# Patient Record
Sex: FEMALE | Race: WHITE | NOT HISPANIC OR LATINO | URBAN - METROPOLITAN AREA
[De-identification: names, ages, dates, MRNs, and addresses within clinical notes are randomized per-mention and may not be internally consistent; named-entity substitution may affect disease eponyms.]

---

## 2021-08-19 ENCOUNTER — OFFICE VISIT (OUTPATIENT)
Dept: URBAN - METROPOLITAN AREA CLINIC 27 | Facility: CLINIC | Age: 71
End: 2021-08-19

## 2021-09-27 ENCOUNTER — OFFICE VISIT (OUTPATIENT)
Dept: URBAN - METROPOLITAN AREA SURGERY CENTER 7 | Facility: SURGERY CENTER | Age: 71
End: 2021-09-27

## 2021-09-27 PROBLEM — 40930008 HYPOTHYROIDISM: Status: ACTIVE | Noted: 2021-09-27

## 2021-09-27 PROBLEM — 429484003 HISTORY OF MALIGNANT NEOPLASM OF CERVIX: Status: ACTIVE | Noted: 2021-09-27

## 2021-09-27 PROBLEM — 276661002 AGE-RELATED OSTEOPOROSIS: Status: ACTIVE | Noted: 2021-09-27

## 2021-11-01 ENCOUNTER — LAB OUTSIDE AN ENCOUNTER (OUTPATIENT)
Dept: URBAN - METROPOLITAN AREA CLINIC 121 | Facility: CLINIC | Age: 71
End: 2021-11-01

## 2021-11-01 LAB
ZZ-GE-UNK: (no result)

## 2021-11-12 ENCOUNTER — OFFICE VISIT (OUTPATIENT)
Dept: URBAN - METROPOLITAN AREA CLINIC 27 | Facility: CLINIC | Age: 71
End: 2021-11-12

## 2022-01-04 ENCOUNTER — OFFICE VISIT (OUTPATIENT)
Dept: URBAN - METROPOLITAN AREA CLINIC 27 | Facility: CLINIC | Age: 72
End: 2022-01-04

## 2022-01-04 PROBLEM — 426867001 ANORECTAL DISORDER: Status: ACTIVE | Noted: 2022-01-04

## 2022-01-04 PROBLEM — 62315008 DIARRHEA: Status: ACTIVE | Noted: 2022-01-04

## 2022-01-04 PROBLEM — 271832001 FLATULENCE, ERUCTATION AND GAS PAIN: Status: ACTIVE | Noted: 2022-01-04

## 2022-01-04 PROBLEM — 267024001 ABNORMAL WEIGHT LOSS: Status: ACTIVE | Noted: 2022-01-04

## 2022-01-12 ENCOUNTER — OFFICE VISIT (OUTPATIENT)
Dept: URBAN - METROPOLITAN AREA MEDICAL CENTER 8 | Facility: MEDICAL CENTER | Age: 72
End: 2022-01-12

## 2022-04-30 ENCOUNTER — TELEPHONE ENCOUNTER (OUTPATIENT)
Dept: URBAN - METROPOLITAN AREA CLINIC 121 | Facility: CLINIC | Age: 72
End: 2022-04-30

## 2022-04-30 RX ORDER — PRAMOXINE HYDROCHLORIDE 150 MG/15G
APPLY A SMALL AMOUNT RECTALLY EVERY NIGHT AEROSOL, FOAM RECTAL
OUTPATIENT
Start: 2021-12-03 | End: 2022-04-08

## 2022-04-30 RX ORDER — HYDROCORTISONE ACETATE, PRAMOXINE HCL 2.5; 1 G/100G; G/100G
APPLY A SMALL AMOUNT INTO RECTUM AT BEDTIME VIA APPLICATOR CREAM TOPICAL
OUTPATIENT
Start: 2021-12-17 | End: 2022-02-15

## 2022-04-30 RX ORDER — PRAMOXINE HYDROCHLORIDE 150 MG/15G
APPLY A SMALL AMOUNT RECTALLY EVERY NIGHT AEROSOL, FOAM RECTAL
OUTPATIENT
Start: 2021-12-03 | End: 2022-03-03

## 2022-05-01 ENCOUNTER — TELEPHONE ENCOUNTER (OUTPATIENT)
Dept: URBAN - METROPOLITAN AREA CLINIC 121 | Facility: CLINIC | Age: 72
End: 2022-05-01

## 2022-05-01 RX ORDER — ESTRADIOL 10 UG/1
TABLET, FILM COATED VAGINAL
Status: ACTIVE | COMMUNITY
Start: 2021-08-20

## 2022-05-01 RX ORDER — CHONDROIT/COLLAGEN/HYALURON/AA 80-400-40
CAPSULE ORAL
Status: ACTIVE | COMMUNITY
Start: 2021-08-20

## 2022-05-01 RX ORDER — VITAMIN B COMPLEX
TABLET ORAL
Status: ACTIVE | COMMUNITY
Start: 2021-08-20

## 2022-05-01 RX ORDER — LEVOTHYROXINE SODIUM 137 UG/1
TABLET ORAL
Status: ACTIVE | COMMUNITY
Start: 2021-08-20

## 2022-05-01 RX ORDER — VITAMIN K2 40 MCG
TABLET ORAL
Status: ACTIVE | COMMUNITY
Start: 2021-08-20

## 2022-05-01 RX ORDER — CHROMIUM 200 MCG
TABLET ORAL
Status: ACTIVE | COMMUNITY
Start: 2021-08-20

## 2022-05-01 RX ORDER — PARSLEY 450 MG
CAPSULE ORAL
Status: ACTIVE | COMMUNITY
Start: 2021-08-20

## 2022-05-01 RX ORDER — OMEGA-3S/DHA/EPA/FISH OIL 300-1000MG
CAPSULE ORAL
Status: ACTIVE | COMMUNITY
Start: 2021-08-20

## 2024-06-24 ENCOUNTER — OFFICE VISIT (OUTPATIENT)
Dept: URBAN - METROPOLITAN AREA CLINIC 27 | Facility: CLINIC | Age: 74
End: 2024-06-24
Payer: MEDICARE

## 2024-06-24 ENCOUNTER — DASHBOARD ENCOUNTERS (OUTPATIENT)
Age: 74
End: 2024-06-24

## 2024-06-24 VITALS
SYSTOLIC BLOOD PRESSURE: 132 MMHG | HEIGHT: 62 IN | WEIGHT: 94 LBS | HEART RATE: 69 BPM | DIASTOLIC BLOOD PRESSURE: 87 MMHG | BODY MASS INDEX: 17.3 KG/M2

## 2024-06-24 DIAGNOSIS — R13.19 ESOPHAGEAL DYSPHAGIA: ICD-10-CM

## 2024-06-24 DIAGNOSIS — K62.5 RECTAL BLEEDING: ICD-10-CM

## 2024-06-24 DIAGNOSIS — R14.0 ABDOMINAL DISTENSION (GASEOUS): ICD-10-CM

## 2024-06-24 DIAGNOSIS — K21.9 GASTROESOPHAGEAL REFLUX DISEASE, UNSPECIFIED WHETHER ESOPHAGITIS PRESENT: ICD-10-CM

## 2024-06-24 PROBLEM — 235595009: Status: ACTIVE | Noted: 2024-06-24

## 2024-06-24 PROBLEM — 40890009: Status: ACTIVE | Noted: 2024-06-24

## 2024-06-24 PROBLEM — 12063002: Status: ACTIVE | Noted: 2024-06-24

## 2024-06-24 PROBLEM — 88111009: Status: ACTIVE | Noted: 2024-06-24

## 2024-06-24 PROCEDURE — 99214 OFFICE O/P EST MOD 30 MIN: CPT | Performed by: PHYSICIAN ASSISTANT

## 2024-06-24 RX ORDER — OMEGA-3S/DHA/EPA/FISH OIL 300-1000MG
CAPSULE ORAL
Status: ACTIVE | COMMUNITY
Start: 2021-08-20

## 2024-06-24 RX ORDER — VITAMIN B COMPLEX
TABLET ORAL
Status: ACTIVE | COMMUNITY
Start: 2021-08-20

## 2024-06-24 RX ORDER — CHROMIUM 200 MCG
TABLET ORAL
Status: ACTIVE | COMMUNITY
Start: 2021-08-20

## 2024-06-24 RX ORDER — PARSLEY 450 MG
CAPSULE ORAL
Status: ACTIVE | COMMUNITY
Start: 2021-08-20

## 2024-06-24 RX ORDER — CHONDROIT/COLLAGEN/HYALURON/AA 80-400-40
CAPSULE ORAL
Status: ACTIVE | COMMUNITY
Start: 2021-08-20

## 2024-06-24 RX ORDER — ESTRADIOL 10 UG/1
TABLET, FILM COATED VAGINAL
Status: ACTIVE | COMMUNITY
Start: 2021-08-20

## 2024-06-24 RX ORDER — LEVOTHYROXINE SODIUM 137 UG/1
TABLET ORAL
Status: ACTIVE | COMMUNITY
Start: 2021-08-20

## 2024-06-24 RX ORDER — VITAMIN K2 40 MCG
TABLET ORAL
Status: ACTIVE | COMMUNITY
Start: 2021-08-20

## 2024-06-24 NOTE — PHYSICAL EXAM CONSTITUTIONAL:
Detail Level: Generalized well developed, well nourished , in no acute distress , ambulating without difficulty , normal communication ability

## 2024-06-24 NOTE — HPI-ZZZTODAY'S VISIT
Ms. Olivo is a 74-year-old female patient of Dr. Tolentino here for evaluation of rectal bleeding and change in bowel habits. Over the last several months she has had increased stool frequency with sensation of incomplete emptying. Sx are progressive. No incontinence or diarrhea. Last week she passed a moderate amt of bright red blood and mucus per rectum. 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 No significant bleeding in the interim until now. She has history of cervical cancer s/p XRT.  She also c/o abdominal bloating. Her functional med doctor treated her w/Xifaxan several yrs ago which was very helpful, she does not know if she wants to repeat Xifaxan at this time. She has new onset dysphagia x 1 month and intermittent chest burning, hiccups. No regurgitation of the food bolus. She is not on reflux medications, would rather treat naturally. Weight is stable. . Colonoscopy 2021:Radiation proctitis, small internal hemorrhoids, 2 small polyps; recall 5 years

## 2024-07-10 ENCOUNTER — CLAIMS CREATED FROM THE CLAIM WINDOW (OUTPATIENT)
Dept: URBAN - METROPOLITAN AREA CLINIC 4 | Facility: CLINIC | Age: 74
End: 2024-07-10
Payer: COMMERCIAL

## 2024-07-10 ENCOUNTER — CLAIMS CREATED FROM THE CLAIM WINDOW (OUTPATIENT)
Dept: URBAN - METROPOLITAN AREA SURGERY CENTER 7 | Facility: SURGERY CENTER | Age: 74
End: 2024-07-10
Payer: COMMERCIAL

## 2024-07-10 DIAGNOSIS — R13.19 CERVICAL DYSPHAGIA: ICD-10-CM

## 2024-07-10 DIAGNOSIS — K31.89 OTHER DISEASES OF STOMACH AND DUODENUM: ICD-10-CM

## 2024-07-10 DIAGNOSIS — K21.9 GERD: ICD-10-CM

## 2024-07-10 DIAGNOSIS — K21.9 GASTRO-ESOPHAGEAL REFLUX DISEASE WITHOUT ESOPHAGITIS: ICD-10-CM

## 2024-07-10 DIAGNOSIS — K21.9 ACID REFLUX: ICD-10-CM

## 2024-07-10 DIAGNOSIS — K44.9 HIATAL HERNIA: ICD-10-CM

## 2024-07-10 PROCEDURE — 43239 EGD BIOPSY SINGLE/MULTIPLE: CPT | Performed by: INTERNAL MEDICINE

## 2024-07-10 PROCEDURE — 00731 ANES UPR GI NDSC PX NOS: CPT | Performed by: NURSE ANESTHETIST, CERTIFIED REGISTERED

## 2024-07-10 PROCEDURE — 43248 EGD GUIDE WIRE INSERTION: CPT | Performed by: INTERNAL MEDICINE

## 2024-07-10 PROCEDURE — 88305 TISSUE EXAM BY PATHOLOGIST: CPT | Performed by: PATHOLOGY

## 2024-07-10 RX ORDER — CHONDROIT/COLLAGEN/HYALURON/AA 80-400-40
CAPSULE ORAL
Status: ACTIVE | COMMUNITY
Start: 2021-08-20

## 2024-07-10 RX ORDER — ESTRADIOL 10 UG/1
TABLET, FILM COATED VAGINAL
Status: ACTIVE | COMMUNITY
Start: 2021-08-20

## 2024-07-10 RX ORDER — PARSLEY 450 MG
CAPSULE ORAL
Status: ACTIVE | COMMUNITY
Start: 2021-08-20

## 2024-07-10 RX ORDER — CHROMIUM 200 MCG
TABLET ORAL
Status: ACTIVE | COMMUNITY
Start: 2021-08-20

## 2024-07-10 RX ORDER — VITAMIN B COMPLEX
TABLET ORAL
Status: ACTIVE | COMMUNITY
Start: 2021-08-20

## 2024-07-10 RX ORDER — OMEGA-3S/DHA/EPA/FISH OIL 300-1000MG
CAPSULE ORAL
Status: ACTIVE | COMMUNITY
Start: 2021-08-20

## 2024-07-10 RX ORDER — LEVOTHYROXINE SODIUM 137 UG/1
TABLET ORAL
Status: ACTIVE | COMMUNITY
Start: 2021-08-20

## 2024-07-10 RX ORDER — VITAMIN K2 40 MCG
TABLET ORAL
Status: ACTIVE | COMMUNITY
Start: 2021-08-20

## 2024-07-16 ENCOUNTER — OFFICE VISIT (OUTPATIENT)
Dept: URBAN - METROPOLITAN AREA MEDICAL CENTER 8 | Facility: MEDICAL CENTER | Age: 74
End: 2024-07-16

## 2024-07-16 ENCOUNTER — TELEPHONE ENCOUNTER (OUTPATIENT)
Dept: URBAN - METROPOLITAN AREA CLINIC 27 | Facility: CLINIC | Age: 74
End: 2024-07-16

## 2024-07-16 RX ORDER — PARSLEY 450 MG
CAPSULE ORAL
Status: ACTIVE | COMMUNITY
Start: 2021-08-20

## 2024-07-16 RX ORDER — ESTRADIOL 10 UG/1
TABLET, FILM COATED VAGINAL
Status: ACTIVE | COMMUNITY
Start: 2021-08-20

## 2024-07-16 RX ORDER — CHONDROIT/COLLAGEN/HYALURON/AA 80-400-40
CAPSULE ORAL
Status: ACTIVE | COMMUNITY
Start: 2021-08-20

## 2024-07-16 RX ORDER — CHROMIUM 200 MCG
TABLET ORAL
Status: ACTIVE | COMMUNITY
Start: 2021-08-20

## 2024-07-16 RX ORDER — VITAMIN K2 40 MCG
TABLET ORAL
Status: ACTIVE | COMMUNITY
Start: 2021-08-20

## 2024-07-16 RX ORDER — VITAMIN B COMPLEX
TABLET ORAL
Status: ACTIVE | COMMUNITY
Start: 2021-08-20

## 2024-07-16 RX ORDER — OMEGA-3S/DHA/EPA/FISH OIL 300-1000MG
CAPSULE ORAL
Status: ACTIVE | COMMUNITY
Start: 2021-08-20

## 2024-07-16 RX ORDER — LEVOTHYROXINE SODIUM 137 UG/1
TABLET ORAL
Status: ACTIVE | COMMUNITY
Start: 2021-08-20

## 2024-07-17 ENCOUNTER — TELEPHONE ENCOUNTER (OUTPATIENT)
Dept: URBAN - METROPOLITAN AREA CLINIC 27 | Facility: CLINIC | Age: 74
End: 2024-07-17

## 2024-07-18 ENCOUNTER — TELEPHONE ENCOUNTER (OUTPATIENT)
Dept: URBAN - METROPOLITAN AREA CLINIC 27 | Facility: CLINIC | Age: 74
End: 2024-07-18

## 2024-07-19 ENCOUNTER — WEB ENCOUNTER (OUTPATIENT)
Dept: URBAN - METROPOLITAN AREA CLINIC 27 | Facility: CLINIC | Age: 74
End: 2024-07-19

## 2024-07-22 ENCOUNTER — WEB ENCOUNTER (OUTPATIENT)
Dept: URBAN - METROPOLITAN AREA CLINIC 27 | Facility: CLINIC | Age: 74
End: 2024-07-22

## 2024-07-22 RX ORDER — MESALAMINE 1000 MG/1
1 SUPPOSITORY AT BEDTIME SUPPOSITORY RECTAL ONCE A DAY
Qty: 30 | OUTPATIENT
Start: 2024-07-22 | End: 2024-08-21

## 2024-07-22 RX ORDER — MESALAMINE 1000 MG/1
1 SUPPOSITORY AT BEDTIME SUPPOSITORY RECTAL ONCE A DAY
Qty: 30 | Refills: 0
Start: 2024-07-22 | End: 2024-08-22

## 2024-07-31 ENCOUNTER — WEB ENCOUNTER (OUTPATIENT)
Dept: URBAN - METROPOLITAN AREA CLINIC 27 | Facility: CLINIC | Age: 74
End: 2024-07-31

## 2024-07-31 ENCOUNTER — TELEPHONE ENCOUNTER (OUTPATIENT)
Dept: URBAN - METROPOLITAN AREA CLINIC 27 | Facility: CLINIC | Age: 74
End: 2024-07-31

## 2024-08-02 ENCOUNTER — LAB OUTSIDE AN ENCOUNTER (OUTPATIENT)
Dept: URBAN - METROPOLITAN AREA CLINIC 27 | Facility: CLINIC | Age: 74
End: 2024-08-02

## 2024-08-02 ENCOUNTER — TELEPHONE ENCOUNTER (OUTPATIENT)
Dept: URBAN - METROPOLITAN AREA CLINIC 27 | Facility: CLINIC | Age: 74
End: 2024-08-02

## 2024-08-02 ENCOUNTER — WEB ENCOUNTER (OUTPATIENT)
Dept: URBAN - METROPOLITAN AREA CLINIC 27 | Facility: CLINIC | Age: 74
End: 2024-08-02

## 2024-08-05 ENCOUNTER — OFFICE VISIT (OUTPATIENT)
Dept: URBAN - METROPOLITAN AREA CLINIC 27 | Facility: CLINIC | Age: 74
End: 2024-08-05

## 2024-08-06 ENCOUNTER — LAB OUTSIDE AN ENCOUNTER (OUTPATIENT)
Dept: URBAN - METROPOLITAN AREA CLINIC 27 | Facility: CLINIC | Age: 74
End: 2024-08-06

## 2024-08-06 ENCOUNTER — OFFICE VISIT (OUTPATIENT)
Dept: URBAN - METROPOLITAN AREA CLINIC 27 | Facility: CLINIC | Age: 74
End: 2024-08-06
Payer: COMMERCIAL

## 2024-08-06 VITALS
WEIGHT: 92.4 LBS | DIASTOLIC BLOOD PRESSURE: 86 MMHG | SYSTOLIC BLOOD PRESSURE: 134 MMHG | BODY MASS INDEX: 17 KG/M2 | HEART RATE: 71 BPM | HEIGHT: 62 IN

## 2024-08-06 DIAGNOSIS — R63.4 ABNORMAL WEIGHT LOSS: ICD-10-CM

## 2024-08-06 DIAGNOSIS — R19.7 ACUTE DIARRHEA: ICD-10-CM

## 2024-08-06 DIAGNOSIS — Z85.41 PERSONAL HISTORY OF MALIGNANT NEOPLASM OF CERVIX UTERI: ICD-10-CM

## 2024-08-06 DIAGNOSIS — R14.0 ABDOMINAL DISTENSION (GASEOUS): ICD-10-CM

## 2024-08-06 PROCEDURE — 99215 OFFICE O/P EST HI 40 MIN: CPT | Performed by: INTERNAL MEDICINE

## 2024-08-06 RX ORDER — CHONDROIT/COLLAGEN/HYALURON/AA 80-400-40
CAPSULE ORAL
Status: ACTIVE | COMMUNITY
Start: 2021-08-20

## 2024-08-06 RX ORDER — CHROMIUM 200 MCG
TABLET ORAL
Status: ACTIVE | COMMUNITY
Start: 2021-08-20

## 2024-08-06 RX ORDER — LEVOTHYROXINE SODIUM 137 UG/1
TABLET ORAL
Status: ACTIVE | COMMUNITY
Start: 2021-08-20

## 2024-08-06 RX ORDER — ESTRADIOL 10 UG/1
TABLET, FILM COATED VAGINAL
Status: ACTIVE | COMMUNITY
Start: 2021-08-20

## 2024-08-06 RX ORDER — VITAMIN B COMPLEX
TABLET ORAL
Status: ACTIVE | COMMUNITY
Start: 2021-08-20

## 2024-08-06 RX ORDER — MESALAMINE 1000 MG/1
1 SUPPOSITORY AT BEDTIME SUPPOSITORY RECTAL ONCE A DAY
Qty: 30 | Refills: 0 | Status: ACTIVE | COMMUNITY
Start: 2024-07-22 | End: 2024-08-22

## 2024-08-06 RX ORDER — PARSLEY 450 MG
CAPSULE ORAL
Status: ACTIVE | COMMUNITY
Start: 2021-08-20

## 2024-08-06 RX ORDER — OMEGA-3S/DHA/EPA/FISH OIL 300-1000MG
CAPSULE ORAL
Status: ACTIVE | COMMUNITY
Start: 2021-08-20

## 2024-08-06 RX ORDER — VITAMIN K2 40 MCG
TABLET ORAL
Status: ACTIVE | COMMUNITY
Start: 2021-08-20

## 2024-08-06 NOTE — PHYSICAL EXAM GASTROINTESTINAL
Abdomen , soft, mildly TTP diffusely, moderately distended, no guarding or rigidity , no masses palpable , normal bowel sounds , Liver and Spleen , no hepatomegaly present , no hepatosplenomegaly , liver nontender , spleen not palpable

## 2024-08-06 NOTE — HPI-ZZZTODAY'S VISIT
Ms. Olivo is a 74-year-old female patient of Dr. Tolentino here for evaluation of diarrhea. She was seen 2 mos ago with fecal urgency and rectal bleeding. She underwent colonoscopy with APC of multiple rectal AVMs. She has had no further bleeding, but her diarrhea and urgency has worsened. Stool was negative for C. diff. She was prescribed Canasa suppositories but felt this worsened her diarrhea and caused nausea, HAs. She is often hungry but feels she can't eat b/c this causes diarrhea. She has lost several pounds. She has abdominal bloating which is evident on exam today. She came in for H. pylori breath test last wk, results are pending. Xifaxan helped in the past, though she has opted to defer repeat course of this due to cost. She has h/o cervical cancer s/p XRT. . EGD 7/10/2024:Empiric esophageal dilation, small HH, erythema in the duodenum, biopsies unremarkable; esophageal biopsies show reflux changes Colonoscopy 7/16/2024:Multiple angioectasias with stigmata of recent bleeding in the rectum s/p APC; repeat colonoscopy in 6 months to 1 year for surveillance if bleeding continues No

## 2024-08-07 ENCOUNTER — TELEPHONE ENCOUNTER (OUTPATIENT)
Dept: URBAN - METROPOLITAN AREA CLINIC 27 | Facility: CLINIC | Age: 74
End: 2024-08-07

## 2024-08-07 LAB
A/G RATIO: 1.7
ABSOLUTE BASOPHILS: 51
ABSOLUTE EOSINOPHILS: 110
ABSOLUTE LYMPHOCYTES: 787
ABSOLUTE MONOCYTES: 492
ABSOLUTE NEUTROPHILS: 3160
ALBUMIN: 3.9
ALKALINE PHOSPHATASE: 45
ALT (SGPT): 16
AST (SGOT): 25
BASOPHILS: 1.1
BILIRUBIN, TOTAL: 0.4
BUN/CREATININE RATIO: (no result)
BUN: 13
CALCIUM: 9.9
CARBON DIOXIDE, TOTAL: 23
CHLORIDE: 104
CREATININE: 0.74
EGFR: 84
EOSINOPHILS: 2.4
GLOBULIN, TOTAL: 2.3
GLUCOSE: 95
HEMATOCRIT: 41.4
HEMOGLOBIN: 13.9
LYMPHOCYTES: 17.1
MCH: 32.6
MCHC: 33.6
MCV: 97
MONOCYTES: 10.7
MPV: 10
NEUTROPHILS: 68.7
PLATELET COUNT: 269
POTASSIUM: 5.5
PROTEIN, TOTAL: 6.2
RDW: 11.3
RED BLOOD CELL COUNT: 4.27
SODIUM: 138
TSH: 1.55
WHITE BLOOD CELL COUNT: 4.6

## 2024-08-08 ENCOUNTER — LAB OUTSIDE AN ENCOUNTER (OUTPATIENT)
Dept: URBAN - METROPOLITAN AREA CLINIC 27 | Facility: CLINIC | Age: 74
End: 2024-08-08

## 2024-08-08 ENCOUNTER — WEB ENCOUNTER (OUTPATIENT)
Dept: URBAN - METROPOLITAN AREA CLINIC 27 | Facility: CLINIC | Age: 74
End: 2024-08-08

## 2024-08-09 LAB — H PYLORI BREATH TEST: NOT DETECTED

## 2024-08-15 LAB
CAMPYLOBACTER SPP. AG,EIA: (no result)
OVA AND PARASITES, CONC AND PERM SMEAR: (no result)
PANCREATIC ELASTASE, FECAL: >500
SALMONELLA AND SHIGELLA, CULTURE: (no result)
SHIGA TOXINS, EIA W/RFL TO E.COLI O157 CULTURE: (no result)

## 2024-08-19 ENCOUNTER — LAB OUTSIDE AN ENCOUNTER (OUTPATIENT)
Dept: URBAN - METROPOLITAN AREA CLINIC 27 | Facility: CLINIC | Age: 74
End: 2024-08-19

## 2024-08-19 ENCOUNTER — TELEPHONE ENCOUNTER (OUTPATIENT)
Dept: URBAN - METROPOLITAN AREA CLINIC 27 | Facility: CLINIC | Age: 74
End: 2024-08-19

## 2024-08-20 ENCOUNTER — WEB ENCOUNTER (OUTPATIENT)
Dept: URBAN - METROPOLITAN AREA CLINIC 27 | Facility: CLINIC | Age: 74
End: 2024-08-20

## 2024-08-20 ENCOUNTER — TELEPHONE ENCOUNTER (OUTPATIENT)
Dept: URBAN - METROPOLITAN AREA CLINIC 27 | Facility: CLINIC | Age: 74
End: 2024-08-20

## 2024-08-20 LAB
BUN/CREATININE RATIO: (no result)
CALCIUM: 9.1
CARBON DIOXIDE: 25
CHLORIDE: 105
CREATININE: 0.68
EGFR: 91
GLUCOSE: 76
POTASSIUM: 4.3
SODIUM: 137
UREA NITROGEN (BUN): 8

## 2024-08-23 ENCOUNTER — WEB ENCOUNTER (OUTPATIENT)
Dept: URBAN - METROPOLITAN AREA CLINIC 27 | Facility: CLINIC | Age: 74
End: 2024-08-23

## 2024-08-27 ENCOUNTER — WEB ENCOUNTER (OUTPATIENT)
Dept: URBAN - METROPOLITAN AREA CLINIC 27 | Facility: CLINIC | Age: 74
End: 2024-08-27

## 2024-08-28 ENCOUNTER — WEB ENCOUNTER (OUTPATIENT)
Dept: URBAN - METROPOLITAN AREA CLINIC 27 | Facility: CLINIC | Age: 74
End: 2024-08-28

## 2024-09-05 ENCOUNTER — OFFICE VISIT (OUTPATIENT)
Dept: URBAN - METROPOLITAN AREA CLINIC 27 | Facility: CLINIC | Age: 74
End: 2024-09-05